# Patient Record
Sex: MALE | Race: BLACK OR AFRICAN AMERICAN | NOT HISPANIC OR LATINO | URBAN - METROPOLITAN AREA
[De-identification: names, ages, dates, MRNs, and addresses within clinical notes are randomized per-mention and may not be internally consistent; named-entity substitution may affect disease eponyms.]

---

## 2021-08-15 ENCOUNTER — EMERGENCY (EMERGENCY)
Facility: HOSPITAL | Age: 26
LOS: 1 days | Discharge: HOME | End: 2021-08-15
Attending: EMERGENCY MEDICINE
Payer: COMMERCIAL

## 2021-08-15 VITALS
OXYGEN SATURATION: 99 % | TEMPERATURE: 98 F | RESPIRATION RATE: 18 BRPM | SYSTOLIC BLOOD PRESSURE: 110 MMHG | WEIGHT: 229.94 LBS | HEART RATE: 83 BPM | DIASTOLIC BLOOD PRESSURE: 61 MMHG | HEIGHT: 71 IN

## 2021-08-15 PROCEDURE — 99283 EMERGENCY DEPT VISIT LOW MDM: CPT

## 2021-08-15 PROCEDURE — 99053 MED SERV 10PM-8AM 24 HR FAC: CPT

## 2021-08-15 NOTE — ED PROVIDER NOTE - PATIENT PORTAL LINK FT
You can access the FollowMyHealth Patient Portal offered by [facility] by registering at the following website: [email or web address]. By joining [facility]’s FollowMyHealth portal, you will also be able to view your health information using other applications (apps) compatible with our system.

## 2021-08-15 NOTE — ED PROVIDER NOTE - NSFOLLOWUPINSTRUCTIONS_ED_ALL_ED_FT
GERD (Gastroesophageal Reflux Disease)    WHAT YOU NEED TO KNOW:    What is gastroesophageal reflux disease (GERD)? GERD is reflux that occurs more than twice a week for a few weeks. Reflux means acid and food in the stomach back up into the esophagus. It usually causes heartburn and other symptoms. GERD can cause other health problems over time if it is not treated.    Digestive Tract         What causes GERD? GERD often occurs when the lower muscle (sphincter) of the esophagus does not close properly. The sphincter normally opens to let food into the stomach. It then closes to keep food and stomach acid in the stomach. If the sphincter does not close properly, stomach acid and food back up (reflux) into the esophagus. The following may increase your risk for GERD:  •Certain foods such as spicy foods, chocolate, foods that contain caffeine, peppermint, and fried foods      •Hiatal hernia      •Certain medicines such as calcium channel blockers (used to treat high blood pressure), allergy medicines, sedatives, or antidepressants      •Pregnancy or obesity      •Lying down after a meal      •Drinking alcohol or smoking      What are the signs and symptoms of GERD?   •Heartburn (burning pain in your chest)      •Pain after meals that spreads to your neck, jaw, or shoulder      •Pain that gets better when you change positions      •Bitter or acid taste in your mouth      •A dry cough      •Trouble swallowing or pain with swallowing      •Hoarseness or a sore throat      •Burping or hiccups      •Feeling full soon after you start eating      How is GERD diagnosed? Your healthcare provider will ask about your symptoms and when they started. Tell him or her about other medical conditions you have, your eating habits, and your activities. You may also need any of the following:   •The amount of acid in your esophagus and stomach may be checked. A small probe is used to check the amount.      •An endoscopy is a procedure used to look at the inside of your esophagus and stomach. An endoscope is a bendable tube with a light and camera on the end. Your healthcare provider may remove a small sample of tissue and send it to a lab for tests.      •X-ray pictures may be taken of your stomach and intestines (bowel). You may be given a chalky liquid to drink before the pictures are taken. This liquid helps your stomach and intestines show up better on the x-rays.      •Pressure and function tests of your esophagus can help find problems such as a hiatal hernia.      How is GERD treated?   •Medicines are used to decrease stomach acid. Medicine may also be used to help your lower esophageal sphincter and stomach contract (tighten) more.      •Surgery is done to wrap the upper part of the stomach around the esophageal sphincter. This will strengthen the sphincter and prevent reflux.      How can I manage GERD?     Prevent GERD     •Do not have foods or drinks that may increase heartburn. These include chocolate, peppermint, fried or fatty foods, drinks that contain caffeine, or carbonated drinks (soda). Other foods include spicy foods, onions, tomatoes, and tomato-based foods. Do not have foods or drinks that can irritate your esophagus, such as citrus fruits, juices, and alcohol.      •Do not eat large meals. When you eat a lot of food at one time, your stomach needs more acid to digest it. Eat 6 small meals each day instead of 3 large ones, and eat slowly. Do not eat meals 2 to 3 hours before bedtime.      •Elevate the head of your bed. Place 6-inch blocks under the head of your bed frame. You may also use more than one pillow under your head and shoulders while you sleep.      •Maintain a healthy weight. If you are overweight, weight loss may help relieve symptoms of GERD.      •Do not smoke. Smoking weakens the lower esophageal sphincter and increases the risk of GERD. Ask your healthcare provider for information if you currently smoke and need help to quit. E-cigarettes or smokeless tobacco still contain nicotine. Talk to your healthcare provider before you use these products.       •Do not wear clothing that is tight around your waist. Tight clothing can put pressure on your stomach and cause or worsen GERD symptoms.       Call your local emergency number (911 in the [place]) if:   •You have severe chest pain and sudden trouble breathing.          When should I seek immediate care?   •You have trouble breathing after you vomit.      •You have trouble swallowing, or pain with swallowing.      •Your bowel movements are black, bloody, or tarry-looking.      •Your vomit looks like coffee grounds or has blood in it.      When should I call my doctor?   •You feel full and cannot burp or vomit.      •You vomit large amounts, or you vomit often.      •You are losing weight without trying.      •Your symptoms get worse or do not improve with treatment.      •You have questions or concerns about your condition or care.      CARE AGREEMENT:    You have the right to help plan your care. Learn about your health condition and how it may be treated. Discuss treatment options with your healthcare providers to decide what care you want to receive. You always have the right to refuse treatment.

## 2021-08-15 NOTE — ED PROVIDER NOTE - CARE PROVIDER_API CALL
Davey Darling)  Medicine  [address] [place] [address]  Phone: [phone]  Fax: [phone]  Follow Up Time:

## 2021-08-15 NOTE — ED PROVIDER NOTE - CLINICAL SUMMARY MEDICAL DECISION MAKING FREE TEXT BOX
27 yo M no PMH, well appearing with cough, SOB related to PO intake. Symptoms may suggest GERD, anxiety among other causes.  Less likely ACS, PNA, PE given normal exam and recent w/u. Will provide PO antacid and refer to ENT

## 2021-08-15 NOTE — ED PROVIDER NOTE - OBJECTIVE STATEMENT
25 yo Male p/w nasal congestion and cough with sob after eating for several months. Pt reports numerous cardiology and pulmonary eval normal per pt. Also has seen GI and was treated for reflux. States that symptom usually after meal and only subside after eating several tums. Pt states he feel symptoms when lying down to go to sleep. Reports no cp, leg swelling, acute SOB, fever, cough, leg pain or swelling.

## 2021-08-25 PROBLEM — Z00.00 ENCOUNTER FOR PREVENTIVE HEALTH EXAMINATION: Status: ACTIVE | Noted: 2021-08-25

## 2021-08-30 ENCOUNTER — APPOINTMENT (OUTPATIENT)
Dept: OTOLARYNGOLOGY | Facility: CLINIC | Age: 26
End: 2021-08-30

## 2021-09-09 PROBLEM — Z78.9 OTHER SPECIFIED HEALTH STATUS: Chronic | Status: ACTIVE | Noted: 2021-08-20

## 2021-09-17 ENCOUNTER — APPOINTMENT (OUTPATIENT)
Dept: OTOLARYNGOLOGY | Facility: CLINIC | Age: 26
End: 2021-09-17

## 2025-01-14 ENCOUNTER — NON-APPOINTMENT (OUTPATIENT)
Age: 30
End: 2025-01-14

## 2025-01-16 ENCOUNTER — NON-APPOINTMENT (OUTPATIENT)
Age: 30
End: 2025-01-16